# Patient Record
Sex: FEMALE | Race: WHITE | URBAN - METROPOLITAN AREA
[De-identification: names, ages, dates, MRNs, and addresses within clinical notes are randomized per-mention and may not be internally consistent; named-entity substitution may affect disease eponyms.]

---

## 2020-01-29 ENCOUNTER — OFFICE VISIT - RIVER FALLS (OUTPATIENT)
Dept: FAMILY MEDICINE | Facility: CLINIC | Age: 60
End: 2020-01-29

## 2020-01-29 LAB
ALBUMIN UR-MCNC: NORMAL G/DL
APPEARANCE UR: NORMAL
BILIRUB UR QL STRIP: NORMAL
COLOR UR AUTO: YELLOW
GLUCOSE UR STRIP-MCNC: NORMAL MG/DL
HGB UR QL STRIP: NORMAL
KETONES UR STRIP-MCNC: NORMAL MG/DL
LEUKOCYTE ESTERASE UR QL STRIP: NORMAL
NITRATE UR QL: NEGATIVE
PH UR STRIP: 5 [PH]
SP GR UR STRIP: 1.02
UROBILINOGEN UR STRIP-MCNC: NORMAL MG/DL

## 2020-01-29 ASSESSMENT — MIFFLIN-ST. JEOR: SCORE: 1434.76

## 2020-02-04 ENCOUNTER — OFFICE VISIT - RIVER FALLS (OUTPATIENT)
Dept: FAMILY MEDICINE | Facility: CLINIC | Age: 60
End: 2020-02-04

## 2020-02-04 ASSESSMENT — MIFFLIN-ST. JEOR: SCORE: 1445.64

## 2020-02-10 ENCOUNTER — OFFICE VISIT - RIVER FALLS (OUTPATIENT)
Dept: FAMILY MEDICINE | Facility: CLINIC | Age: 60
End: 2020-02-10

## 2020-02-10 ASSESSMENT — MIFFLIN-ST. JEOR: SCORE: 1466.51

## 2020-02-17 ENCOUNTER — OFFICE VISIT - RIVER FALLS (OUTPATIENT)
Dept: FAMILY MEDICINE | Facility: CLINIC | Age: 60
End: 2020-02-17

## 2020-02-17 ASSESSMENT — MIFFLIN-ST. JEOR: SCORE: 1473.76

## 2020-02-24 ENCOUNTER — OFFICE VISIT - RIVER FALLS (OUTPATIENT)
Dept: FAMILY MEDICINE | Facility: CLINIC | Age: 60
End: 2020-02-24

## 2020-02-24 ASSESSMENT — MIFFLIN-ST. JEOR: SCORE: 1471.95

## 2020-03-03 ENCOUNTER — OFFICE VISIT - RIVER FALLS (OUTPATIENT)
Dept: FAMILY MEDICINE | Facility: CLINIC | Age: 60
End: 2020-03-03

## 2020-03-03 ASSESSMENT — MIFFLIN-ST. JEOR: SCORE: 1468.32

## 2020-03-09 ENCOUNTER — OFFICE VISIT - RIVER FALLS (OUTPATIENT)
Dept: FAMILY MEDICINE | Facility: CLINIC | Age: 60
End: 2020-03-09

## 2020-03-09 ASSESSMENT — MIFFLIN-ST. JEOR: SCORE: 1468.32

## 2020-03-16 ENCOUNTER — OFFICE VISIT - RIVER FALLS (OUTPATIENT)
Dept: FAMILY MEDICINE | Facility: CLINIC | Age: 60
End: 2020-03-16

## 2020-12-14 ENCOUNTER — OFFICE VISIT - RIVER FALLS (OUTPATIENT)
Dept: FAMILY MEDICINE | Facility: CLINIC | Age: 60
End: 2020-12-14

## 2020-12-14 ASSESSMENT — MIFFLIN-ST. JEOR: SCORE: 1496.44

## 2022-02-12 VITALS
OXYGEN SATURATION: 97 % | SYSTOLIC BLOOD PRESSURE: 140 MMHG | BODY MASS INDEX: 37.7 KG/M2 | HEART RATE: 83 BPM | WEIGHT: 212.8 LBS | DIASTOLIC BLOOD PRESSURE: 68 MMHG | TEMPERATURE: 96.4 F | HEIGHT: 63 IN

## 2022-02-12 VITALS
HEIGHT: 63 IN | BODY MASS INDEX: 36.82 KG/M2 | DIASTOLIC BLOOD PRESSURE: 72 MMHG | BODY MASS INDEX: 36.54 KG/M2 | TEMPERATURE: 96.6 F | DIASTOLIC BLOOD PRESSURE: 58 MMHG | WEIGHT: 201.6 LBS | SYSTOLIC BLOOD PRESSURE: 120 MMHG | OXYGEN SATURATION: 98 % | HEART RATE: 95 BPM | HEIGHT: 63 IN | DIASTOLIC BLOOD PRESSURE: 70 MMHG | OXYGEN SATURATION: 98 % | SYSTOLIC BLOOD PRESSURE: 102 MMHG | BODY MASS INDEX: 35.72 KG/M2 | HEIGHT: 63 IN | WEIGHT: 207.8 LBS | HEART RATE: 106 BPM | WEIGHT: 206.2 LBS | WEIGHT: 199.2 LBS | TEMPERATURE: 97.8 F | OXYGEN SATURATION: 99 % | HEART RATE: 112 BPM | SYSTOLIC BLOOD PRESSURE: 110 MMHG | HEART RATE: 99 BPM | HEIGHT: 63 IN | DIASTOLIC BLOOD PRESSURE: 60 MMHG | BODY MASS INDEX: 35.3 KG/M2 | SYSTOLIC BLOOD PRESSURE: 120 MMHG | OXYGEN SATURATION: 99 %

## 2022-02-12 VITALS
BODY MASS INDEX: 36.61 KG/M2 | SYSTOLIC BLOOD PRESSURE: 124 MMHG | HEART RATE: 101 BPM | BODY MASS INDEX: 36.75 KG/M2 | DIASTOLIC BLOOD PRESSURE: 84 MMHG | OXYGEN SATURATION: 98 % | HEART RATE: 100 BPM | HEIGHT: 63 IN | HEART RATE: 108 BPM | TEMPERATURE: 98.2 F | BODY MASS INDEX: 36.61 KG/M2 | DIASTOLIC BLOOD PRESSURE: 80 MMHG | OXYGEN SATURATION: 98 % | WEIGHT: 206.6 LBS | HEIGHT: 63 IN | OXYGEN SATURATION: 98 % | SYSTOLIC BLOOD PRESSURE: 142 MMHG | HEIGHT: 63 IN | SYSTOLIC BLOOD PRESSURE: 126 MMHG | WEIGHT: 206.6 LBS | WEIGHT: 207.4 LBS | DIASTOLIC BLOOD PRESSURE: 80 MMHG
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
## 2022-02-16 NOTE — TELEPHONE ENCOUNTER
---------------------  From: Jazmyne Ram MA (eRx Pool ([id number] - [place]))   To: Amauri Marvin PA-C;     Sent: 7/6/2020 9:27:39 AM CDT  Subject: FW: Medication Management   Due Date/Time: 7/7/2020 9:24:00 AM CDT     PCP:   [facility]    Medication:   Eliquis  Last Filled:  2/24/20    Quantity:  60  Refills:  4    Date of last office visit and reason:   3/9/20  Date of last labs pertaining to condition:  n/a    Note:  please advise     Return to Clinic order placed?  yes     Resource:   _  Phone:   _      ------------------------------------------  From: [facility]  To: Amauri Marvin PA-C  Sent: July 6, 2020 9:24:28 AM CDT  Subject: Medication Management  Due: June 24, 2020 10:20:04 PM CDT     ** On Hold Pending Signature **     Drug: apixaban (Eliquis 5 mg oral tablet), 1 tab(s) Oral bid,x30 day(s)  Quantity: 60 tab(s)  Days Supply: 0  Refills: 3  Substitutions Allowed  Notes from Pharmacy:     Dispensed Drug: apixaban (Eliquis 5 mg oral tablet), TAKE ONE TABLET BY MOUTH TWICE DAILY  Quantity: 60 tab(s)  Days Supply: 30  Refills: 4  Substitutions Allowed  Notes from Pharmacy: This prescription was filled on 7/6/2020. Any refills authorized will be placed on file.  ---------------------------------------------------------------  From: Amauri Marvin PA-C   To: [facility] [employer] [facility]    Sent: 7/6/2020 9:28:21 AM CDT  Subject: FW: Medication Management     ** Submitted: **  Complete:apixaban (Eliquis 5 mg oral tablet)   Signed by Amauri Marvin PA-C  7/6/2020 2:28:00 PM [facility]    ** Approved with modifications: **  apixaban (Eliquis 5 mg tablet)  TAKE ONE TABLET BY MOUTH TWICE DAILY  Qty:  60 tab(s)        Days Supply:  30        Refills:  4          Substitutions Allowed     Route To Pharmacy - [facility]   Note from Pharmacy:  This prescription was filled on 7/6/2020. Any refills authorized will be placed on file.
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
## 2022-02-16 NOTE — TELEPHONE ENCOUNTER
Entered by Cherri Rouse CMA on January 26, 2021 10:34:21 AM CST  ---------------------  From: Cherri Rouse CMA   To: [facility] Dom Dietrich    Sent: 1/26/2021 10:34:21 AM CST  Subject: Medication Management     ** Submitted: **  Order:apixaban (Eliquis 5 mg oral tablet)  1 tab(s)  Oral  bid  Qty:  60 tab(s)        Refills:  0          Substitutions Allowed     Route To Pharmacy - [facility] [place] Dom Dietrich    Signed by Cherri Rouse CMA  1/26/2021 4:33:00 PM [place]    ** Submitted: **  Complete:apixaban (Eliquis 5 mg oral tablet)   Signed by Cherri Rouse CMA  1/26/2021 4:34:00 PM [place]    ** Not Approved:  **  apixaban (Eliquis 5 mg tablet)  TAKE ONE TABLET BY MOUTH TWICE DAILY  Qty:  60 tab(s)        Days Supply:  30        Refills:  4          Substitutions Allowed     Route To Pharmacy - [facility] Dom  Karlo   Signed by Cherri Rouse CMA            ------------------------------------------  From: [facility]  To: Amauri Marvin PA-C  Sent: January 25, 2021 8:53:31 AM CST  Subject: Medication Management  Due: January 22, 2021 9:58:09 AM CST     ** On Hold Pending Signature **     Dispensed Drug: apixaban (Eliquis 5 mg oral tablet), TAKE ONE TABLET BY MOUTH TWICE DAILY  Quantity: 60 tab(s)  Days Supply: 30  Refills: 4  Substitutions Allowed  Notes from Pharmacy:  ------------------------------------------12/14/20 preop  1/29/20 px  rtc due this month
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
## 2022-02-16 NOTE — TELEPHONE ENCOUNTER
Call to patient. She is improved. No pain, minimal swelling. Off work this week, then go back next week with no OT. FU in one month and PRN.

## 2022-02-16 NOTE — NURSING NOTE
CAGE Assessment Entered On:  1/29/2020 10:56 AM CST    Performed On:  1/29/2020 10:56 AM CST by Valeria Garcia CMA               Assessment   Have you ever felt you should cut down on your drinking :   No   Have people annoyed you by criticizing your drinking :   No   Have you ever felt bad or guilty about your drinking :   No   Have you ever taken a drink first thing in the morning to steady your nerves or get rid of a hangover (Eye-opener) :   No   CAGE Score :   0    Valeria Garcia CMA - 1/29/2020 10:56 AM CST

## 2022-02-16 NOTE — NURSING NOTE
Comprehensive Intake Entered On:  2/4/2020 9:33 AM CST    Performed On:  2/4/2020 9:27 AM CST by Lizz Chacon CMA               Summary   Chief Complaint :   Right leg pain; leg is swollen; warm to the touch; started yesterday; keeping elevated, not helping; unsure if it is an allergic reaction to the keflex   Menstrual Status :   Postmenopausal   Weight Measured :   201.6 lb(Converted to: 201 lb 10 oz, 91.44 kg)    Height Measured :   62.5 in(Converted to: 5 ft 2 in, 158.75 cm)    Body Mass Index :   36.28 kg/m2 (HI)    Body Surface Area :   2.01 m2   Systolic Blood Pressure :   102 mmHg   Diastolic Blood Pressure :   60 mmHg   Mean Arterial Pressure :   74 mmHg   Peripheral Pulse Rate :   95 bpm   BP Method :   Manual   HR Method :   Electronic   Oxygen Saturation :   98 %   Lizz Chacon CMA - 2/4/2020 9:27 AM CST   Health Status   Allergies Verified? :   Yes   Medication History Verified? :   Yes   Medical History Verified? :   Yes   Pre-Visit Planning Status :   Completed   Tobacco Use? :   Never smoker   Lizz Chacon CMA - 2/4/2020 9:27 AM CST   Consents   Consent for Immunization Exchange :   Consent Granted   Consent for Immunizations to Providers :   Consent Granted   Lizz Chacon CMA - 2/4/2020 9:27 AM CST   Meds / Allergies   (As Of: 2/4/2020 9:33:00 AM CST)   Allergies (Active)   sulfa drug  Estimated Onset Date:   Unspecified ; Reactions:   Rash ; Created By:   Valeria Garcia CMA; Reaction Status:   Active ; Category:   Drug ; Substance:   sulfa drug ; Type:   Allergy ; Updated By:   Valeria Garcia CMA; Reviewed Date:   2/4/2020 9:27 AM CST        Medication List   (As Of: 2/4/2020 9:33:00 AM [facility])   Prescription/Discharge Order    cephalexin  :   cephalexin ; Status:   Prescribed ; Ordered As Mnemonic:   Keflex 500 mg oral capsule ; Simple Display Line:   500 mg, 1 cap(s), PO, TID, for 10 day(s), 30 cap(s), 0 Refill(s) ; Ordering Provider:   Amauri Marvin PA-C; Catalog Code:   cephalexin ; Order  Dt/Tm:   1/29/2020 9:38:03 AM CST

## 2022-02-16 NOTE — NURSING NOTE
Comprehensive Intake Entered On:  3/3/2020 9:58 AM CST    Performed On:  3/3/2020 9:54 AM CST by Lizz Chacon CMA               Summary   Chief Complaint :   Follow-up Right DVT; swelling has gone down some; pain still in groin area; Resent ultrasound   Menstrual Status :   Postmenopausal   Weight Measured :   206.6 lb(Converted to: 206 lb 10 oz, 93.71 kg)    Height Measured :   62.5 in(Converted to: 5 ft 2 in, 158.75 cm)    Body Mass Index :   37.18 kg/m2 (HI)    Body Surface Area :   2.03 m2   Systolic Blood Pressure :   124 mmHg   Diastolic Blood Pressure :   80 mmHg   Mean Arterial Pressure :   95 mmHg   Peripheral Pulse Rate :   108 bpm (HI)    BP Method :   Manual   HR Method :   Electronic   Oxygen Saturation :   98 %   Lizz Chacon CMA - 3/3/2020 9:54 AM CST   Health Status   Allergies Verified? :   Yes   Medication History Verified? :   Yes   Medical History Verified? :   Yes   Pre-Visit Planning Status :   Completed   Tobacco Use? :   Never smoker   Lizz Chacon CMA - 3/3/2020 9:54 AM CST   Consents   Consent for Immunization Exchange :   Consent Granted   Consent for Immunizations to Providers :   Consent Granted   Lizz Chacon CMA - 3/3/2020 9:54 AM CST   Meds / Allergies   (As Of: 3/3/2020 9:58:25 AM CST)   Allergies (Active)   sulfa drug  Estimated Onset Date:   Unspecified ; Reactions:   Rash ; Created By:   Valeria Garcia CMA; Reaction Status:   Active ; Category:   Drug ; Substance:   sulfa drug ; Type:   Allergy ; Updated By:   Valeria Garcia CMA; Reviewed Date:   3/3/2020 9:57 AM CST        Medication List   (As Of: 3/3/2020 9:58:25 AM CST)   Prescription/Discharge Order    apixaban  :   apixaban ; Status:   Prescribed ; Ordered As Mnemonic:   Eliquis 5 mg oral tablet ; Simple Display Line:   5 mg, 1 tab(s), Oral, bid, for 30 day(s), 60 tab(s), 4 Refill(s) ; Ordering Provider:   Amauri Marvin PA-C; Catalog Code:   apixaban ; Order Dt/Tm:   2/24/2020 11:20:19 AM CST            Home Meds     acetaminophen  :   acetaminophen ; Status:   Documented ; Ordered As Mnemonic:   Tylenol ; Simple Display Line:   Oral, 0 Refill(s) ; Catalog Code:   acetaminophen ; Order Dt/Tm:   2/17/2020 10:09:42 AM CST

## 2022-02-16 NOTE — NURSING NOTE
Comprehensive Intake Entered On:  3/9/2020 10:29 AM CDT    Performed On:  3/9/2020 10:26 AM CDT by Lizz Chacon CMA               Summary   Chief Complaint :   follow-up right DVT; starting to walk and drive more; pain still in the groin area   Menstrual Status :   Postmenopausal   Weight Measured :   206.6 lb(Converted to: 206 lb 10 oz, 93.71 kg)    Height Measured :   62.5 in(Converted to: 5 ft 2 in, 158.75 cm)    Body Mass Index :   37.18 kg/m2 (HI)    Body Surface Area :   2.03 m2   Systolic Blood Pressure :   142 mmHg (HI)    Diastolic Blood Pressure :   80 mmHg   Mean Arterial Pressure :   101 mmHg   Peripheral Pulse Rate :   101 bpm (HI)    BP Method :   Manual   HR Method :   Electronic   Oxygen Saturation :   98 %   Lizz Chacon CMA - 3/9/2020 10:26 AM CDT   Health Status   Allergies Verified? :   Yes   Medication History Verified? :   Yes   Medical History Verified? :   Yes   Pre-Visit Planning Status :   Completed   Tobacco Use? :   Never smoker   Lizz Chacon CMA - 3/9/2020 10:26 AM CDT   Consents   Consent for Immunization Exchange :   Consent Granted   Consent for Immunizations to Providers :   Consent Granted   Lizz Chacon CMA - 3/9/2020 10:26 AM CDT   Meds / Allergies   (As Of: 3/9/2020 10:29:19 AM CDT)   Allergies (Active)   sulfa drug  Estimated Onset Date:   Unspecified ; Reactions:   Rash ; Created By:   Valeria Garcia CMA; Reaction Status:   Active ; Category:   Drug ; Substance:   sulfa drug ; Type:   Allergy ; Updated By:   Valeria Garcia CMA; Reviewed Date:   3/9/2020 10:27 AM CDT        Medication List   (As Of: 3/9/2020 10:29:19 AM CDT)   Prescription/Discharge Order    apixaban  :   apixaban ; Status:   Prescribed ; Ordered As Mnemonic:   Eliquis 5 mg oral tablet ; Simple Display Line:   5 mg, 1 tab(s), Oral, bid, for 30 day(s), 60 tab(s), 4 Refill(s) ; Ordering Provider:   Amauri Marvin PA-C; Catalog Code:   apixaban ; Order Dt/Tm:   2/24/2020 11:20:19 AM [facility]            Home Meds     acetaminophen  :   acetaminophen ; Status:   Documented ; Ordered As Mnemonic:   Tylenol ; Simple Display Line:   Oral, 0 Refill(s) ; Catalog Code:   acetaminophen ; Order Dt/Tm:   2/17/2020 10:09:42 AM CST
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
## 2022-02-16 NOTE — NURSING NOTE
Comprehensive Intake Entered On:  2/10/2020 10:17 AM CST    Performed On:  2/10/2020 10:13 AM CST by Lizz Chacon CMA               Summary   Chief Complaint :   Follow-up right leg pain; still has grion pain; leg is still swollen but not as hard as it was last week. Increase in fatigue; taking tylenol for pain;    Menstrual Status :   Postmenopausal   Weight Measured :   206.2 lb(Converted to: 206 lb 3 oz, 93.53 kg)    Height Measured :   62.5 in(Converted to: 5 ft 2 in, 158.75 cm)    Body Mass Index :   37.11 kg/m2 (HI)    Body Surface Area :   2.03 m2   Systolic Blood Pressure :   120 mmHg   Diastolic Blood Pressure :   72 mmHg   Mean Arterial Pressure :   88 mmHg   Peripheral Pulse Rate :   99 bpm   BP Method :   Manual   HR Method :   Electronic   Oxygen Saturation :   99 %   Lizz Chacon CMA - 2/10/2020 10:13 AM CST   Health Status   Allergies Verified? :   Yes   Medication History Verified? :   Yes   Medical History Verified? :   Yes   Pre-Visit Planning Status :   Completed   Tobacco Use? :   Never smoker   Lizz Chacon CMA - 2/10/2020 10:13 AM CST   Consents   Consent for Immunization Exchange :   Consent Granted   Consent for Immunizations to Providers :   Consent Granted   Lizz Chacon CMA - 2/10/2020 10:13 AM CST   Meds / Allergies   (As Of: 2/10/2020 10:17:41 AM CST)   Allergies (Active)   sulfa drug  Estimated Onset Date:   Unspecified ; Reactions:   Rash ; Created By:   Valeria Garcia CMA; Reaction Status:   Active ; Category:   Drug ; Substance:   sulfa drug ; Type:   Allergy ; Updated By:   Valeria Garcia CMA; Reviewed Date:   2/10/2020 10:16 AM CST        Medication List   (As Of: 2/10/2020 10:17:41 AM [facility])   Prescription/Discharge Order    apixaban  :   apixaban ; Status:   Prescribed ; Ordered As Mnemonic:   Eliquis 5 mg oral tablet ; Simple Display Line:   See Instructions, 10 mg Oral bid x one week, then 5 mg po BID, 58 tab(s), 0 Refill(s) ; Ordering Provider:   Amauri Marvin PA-C; Catalog  Code:   apixaban ; Order Dt/Tm:   2/4/2020 1:42:24 PM CST
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
## 2022-02-16 NOTE — NURSING NOTE
Urine Dipstick POC Entered On:  1/29/2020 9:30 AM CST    Performed On:  1/29/2020 9:29 AM CST by Schoenike , Andrea               Urine Dipstick POC   Urine Color Urine Dipstick :   Yellow   Urine Appearance Urine Dipstick :   Slightly cloudy   Glucose Urine Dipstick :   250 mg/dl   Bilirubin Urine Dipstick :   1+ Small   Ketones Urine Dipstick :   2+   Specific Gravity Urine Dipstick :   1.025   Blood Urine Dipstick :   3+ Large   pH Urine Dipstick :   5   Protein Urine Dipstick :   2+ (100 mg/dl)   Urobilinogen Urine Dipstick :   0.2 mg/dl   Nitrite Urine Dipstick :   Negative   Leukocytes Urine Dipstick :   1+ Small   Schoenike , Andrea - 1/29/2020 9:29 AM CST   Details   Collection Date :   1/29/2020 9:25 AM CST   Handling Specimen POC :   Midstream   POC Test Comments :   Lab Test Preformed by:   [facility] Office  [address] [place] [address]  Phone: [phone]  Fax: [phone]     Schoenike , Andrea - 1/29/2020 9:29 AM CST

## 2022-02-16 NOTE — LETTER
(Inserted Image. Unable to display)   January 29, 2021      FERDINAND LEOS  [address] [place], [place] [id number]        Dear FERDINAND,      Thank you for selecting [facility] (previously [facility]) for your healthcare needs.     Our records indicate you are due for the following services:     Annual Physical    (FYI   Regarding office visits: In some instances, a video visit or telephone visit may be offered as an option.)      To schedule an appointment or if you have further questions, please contact your clinic at [phone].      Powered by [employer]    Sincerely,    Amauri Marvin PA-C

## 2022-02-16 NOTE — TELEPHONE ENCOUNTER
I called patient to check her progress. She states is doing well. Pain and swelling are improved. No SOB or chest pain. Will see me on Monday and prior PRN.    KAH

## 2022-02-16 NOTE — PROGRESS NOTES
Patient:   FERDINAND LEOS            MRN: [id number]            FIN: [id number]               Age:   59 years     Sex:  Female     :  1960   Associated Diagnoses:   Upper respiratory infection; Acute cystitis with hematuria; Glycosuria   Author:   Amauri Marvin PA-C      Report Summary   Diagnosis  Upper respiratory infection (TOL82-SK J06.9).  Patient InstructionsOrders   Visit Information      Date of Service: 2020 08:52 am  Performing Location: [facility]  Encounter#: [id number]      Primary Care Provider (PCP):  NONE ,       Referring Provider:  Amauri Marvin PA-C    NPI# [id number]   Visit type:  General concerns, New symptom.    Accompanied by:  No one.    Source of history:  Self, Medical record.    History limitation:  None.       Chief Complaint   2020 9:06 AM CST    New Patient: c/o bubbles in urine, sore throat, cough, runny nose, ears feel funny x 3 days      History of Present Illness             The patient presents with cough.  The cough is described as hacking.  The severity of the cough is moderate.  The cough is episodic, fluctuates in intensity and is worsening.  The cough has lasted for 3 day(s).  The context of the cough: occurred in association with illness.  Associated symptoms consist of nasal congestion, rhinorrhea, sore throat and denies shortness of breath.  Complaint: Noticed bubbles in urine past few days CC above noted and confirmed with the patient..  Feels some better today.  History of DM2. Not currently on any medications. Purposeful weight loss of over 60 pounds.        Review of Systems   Constitutional:  Fatigue.    Eye:  Negative.    Ear/Nose/Mouth/Throat:  Nasal congestion.    Respiratory:  Cough.    Cardiovascular:  Negative.    Gastrointestinal:  Negative.    Genitourinary:  Negative except as documented in history of present illness.    Hematology/Lymphatics:  Negative.    Endocrine:  Negative.    Immunologic:  Negative.    Musculoskeletal:   Joint pain.    Integumentary:  Negative.    Neurologic:  Negative.    Psychiatric:  Negative.       Health Status   Allergies:    Allergic Reactions (All)  Severity Not Documented  Sulfa drug (Rash)   Medications:  (Selected)   ,    Medications          No Known Home Medications     Problem list:    All Problems  Obesity / SNOMED CT 6686359482 / Probable      Histories   Past Medical History:    No active or resolved past medical history items have been selected or recorded.   Family History:    No family history items have been selected or recorded.   Procedure history:    No active procedure history items have been selected or recorded.   Social History:             No active social history items have been recorded.      Physical Examination   Vital Signs   1/29/2020 9:06 AM CST Temperature Tympanic 97.8 DegF  LOW    Peripheral Pulse Rate 112 bpm  HI    HR Method Electronic    Systolic Blood Pressure 110 mmHg    Diastolic Blood Pressure 58 mmHg  LOW    Mean Arterial Pressure 75 mmHg    BP Site Left arm    BP Method Manual    Oxygen Saturation 98 %      Measurements from flowsheet : Measurements   1/29/2020 9:06 AM CST Height Measured - Standard 62.5 in    Weight Measured - Standard 199.2 lb    BSA 1.99 m2    Body Mass Index 35.85 kg/m2  HI      General:  Alert and oriented, No acute distress.    Eye:  Pupils are equal, round and reactive to light, Extraocular movements are intact, Normal conjunctiva.    HENT:  Normocephalic, Oral mucosa is moist, No pharyngeal erythema.    Neck:  Supple, Non-tender, No lymphadenopathy.    Respiratory:  Lungs are clear to auscultation, Respirations are non-labored, Breath sounds are equal.    Cardiovascular:  Normal rate, Regular rhythm, No murmur.    Gastrointestinal:  Soft, Non-tender, Non-distended, No organomegaly.    Genitourinary:  No costovertebral angle tenderness.    Psychiatric:  Cooperative, Appropriate mood & affect.       Review / Management   Results review:  Lab  results   1/29/2020 9:29 AM CST Urine Color Dipstick Yellow    Urine Appearance Slightly cloudy    Urine pH Dipstick 5    Urine Specific Gravity Dipstick 1.025    Urine Glucose Dipstick 250 mg/dl    Urine Bilirubin Dipstick Small    Urine Ketones Dipstick 2+    Urine Blood Dipstick Large    Urine Protein Dipstick 100 mg/dl    Urine Nitrite Dipstick Negative    Urine Leukocyte Esterase Dipstick Small    Urine Urobilinogen Dipstick 0.2 mg/dl   .       Impression and Plan   Diagnosis     Upper respiratory infection (BAG46-KM J06.9).     Acute cystitis with hematuria (YNS09-CL N30.01).     Glycosuria (OKW42-FN R81).     Patient Instructions:       Counseled: Patient, Regarding diagnosis, Regarding treatment, Regarding medications, Regarding diet, Regarding activity, Verbalized understanding.    Orders     Orders (Selected)   Prescriptions  Prescribed  Keflex 500 mg oral capsule: = 1 cap(s) ( 500 mg ), PO, TID, x 10 day(s), # 30 cap(s), 0 Refill(s), Type: Acute, Pharmacy: [facility] , 1 cap(s) Oral tid,x10 day(s).     Take medicine as prescribed, side effects discussed.  Tylenol/ibuprofen for fever and discomfort.  Push fluids.  RTC if not improving in 36-48 hours, prior if concerns as we have discussed.  Follow up with her primary. Will need blood work to see if her DM needs treatment.

## 2022-02-16 NOTE — TELEPHONE ENCOUNTER
---------------------  From: Valeria Garcia CMA (Phone [employer] ([id number]_[facility]))   To: Roberta Zaidi MD;     Sent: 4/27/2020 2:31:27 PM CDT  Subject: phone note- work note     Phone Message    PCP:    KAH      Time of Call:  12:37 pm       Person Calling:   Rina  Phone number:  [phone]    Returned call at: 2:26 pm    Note:  Patient called stating her work is requiring that the note TIMOTHY wrote for patient to be typed on clinic letter headed. Then [employer] would like it faxed to them. Patient read copy to me: 3/16/2020: Rina Wright,   No work for the week due to illness, may return 3-, no OT, scheduled shifts only, no mandatory overtime or covering other shifts. Please fax to Fax # [phone]   Attn: Cherri Mario.    Transferred to: [facility]---------------------  From: Roberta Zaidi MD   To: Phone [employer] ([id number]_[facility]);     Sent: 4/27/2020 2:33:29 PM CDT  Subject: RE: phone note- work note     I can take care of this tomorrow in [place].notedprinted - please fax and then scan to chart---------------------  From: Roberta Zaidi MD   To: Phone [employer] ([id number]_[facility]);     Sent: 4/28/2020 8:28:48 AM CDT  Subject: RE: phone note- work noteNote faxed @ 9:00amnote sent up front to be scanned into EMR.Time: 11:25 am  Note: SAMUEL on patients voicemail, notifying her that this has been taking care of, faxed to work this Morning.

## 2022-02-16 NOTE — PROGRESS NOTES
Patient:   FERDINAND LEOS            MRN: [id number]            FIN: [id number]               Age:   59 years     Sex:  Female     :  1960   Associated Diagnoses:   Pain and swelling of right lower leg; Right leg DVT   Author:   Amauri Marvin PA-C      Report Summary   Diagnosis  Pain and swelling of right lower leg (BHD76-KT M79.661).  Patient InstructionsOrders   Visit Information      Date of Service: 2020 09:20 am  Performing Location: [facility]  Encounter#: [id number]      Primary Care Provider (PCP):  NONE ,       Referring Provider:  Amauri Marvin PA-C    NPI# [id number]   Visit type:  General concerns.    Accompanied by:  No one.    Source of history:  Self, Medical record.    Referral source:  Self.    History limitation:  None.       Chief Complaint   2020 9:27 AM CST     Right leg pain; leg is swollen; warm to the touch; started yesterday; keeping elevated, not helping; unsure if it is an allergic reaction to the keflex      History of Present Illness             The patient presents with lower extremity pain.  The location of the lower extremity pain is the right.  The lower extremity pain is described as aching.  The severity of the lower extremity pain is moderate.  The lower extremity pain is constant.  The lower extremity pain has lasted for 1 day(s).  Radiation of pain: none.  Right leg swollen and painful. Cough and urinary symptoms have cleared. Has had 5 days of Keflex. CC above noted and confirmed with the patient..  No SOB, no chest pain..        Review of Systems   Constitutional:  Negative.    Cardiovascular:  Negative except as documented in history of present illness.    Musculoskeletal:  Negative except as documented in history of present illness.    Integumentary:  Negative except as documented in history of present illness.    Neurologic:  Negative except as documented in history of present illness.       Health Status   Allergies:    Allergic Reactions  (Selected)  Severity Not Documented  Sulfa drug (Rash)   Medications:  (Selected)   Prescriptions  Prescribed  Keflex 500 mg oral capsule: = 1 cap(s) ( 500 mg ), PO, TID, x 10 day(s), # 30 cap(s), 0 Refill(s), Type: Acute, Pharmacy: [facility] , 1 cap(s) Oral tid,x10 day(s),    Medications          *denotes recorded medication          Keflex 500 mg oral capsule: 500 mg, 1 cap(s), PO, TID, for 10 day(s), 30 cap(s), 0 Refill(s).       Problem list:    All Problems  Obesity / SNOMED CT 0450246705 / Probable      Histories   Past Medical History:    No active or resolved past medical history items have been selected or recorded.   Family History:    No family history items have been selected or recorded.   Procedure history:    No active procedure history items have been selected or recorded.   Social History:        Alcohol Assessment            Current, 1-2 times per month, 2 drinks/episode average.      Tobacco Assessment: Denies Tobacco Use            Never (less than 100 in lifetime)      Substance Abuse Assessment: Denies Substance Abuse            Never      Employment and Education Assessment            Employed, Work/School description: .      Home and Environment Assessment            Marital status: .      Sexual Assessment            Identifies as female, Sexual orientation: Straight or heterosexual.        Physical Examination   Vital Signs   2/4/2020 9:27 AM CST Peripheral Pulse Rate 95 bpm    HR Method Electronic    Systolic Blood Pressure 102 mmHg    Diastolic Blood Pressure 60 mmHg    Mean Arterial Pressure 74 mmHg    BP Method Manual    Oxygen Saturation 98 %      Measurements from flowsheet : Measurements   2/4/2020 9:27 AM CST Height Measured - Standard 62.5 in    Weight Measured - Standard 201.6 lb    BSA 2.01 m2    Body Mass Index 36.28 kg/m2  HI      Respiratory:  Respirations are non-labored.    Cardiovascular:  Normal rate.         Arterial  pulses: Right, Posterior tibial, Dorsalis pedis, 2+.         Capillary refill: Right, Lower extremity, Within normal limits.    Musculoskeletal:  Right leg is swollen. Tender in anterior medial thigh..       Review / Management   Radiology results   Ultrasound, Large RLE DVT to mid calf      Impression and Plan   Diagnosis     Pain and swelling of right lower leg (DFX55-TW M79.661).     Right leg DVT (SZM14-IW I82.401).     Patient Instructions:       Counseled: Patient, Activity, Verbalized understanding.    Orders     Orders (Selected)   Prescriptions  Prescribed  Eliquis 5 mg oral tablet: See Instructions, Instructions: 10 mg Oral bid x one week, then 5 mg po BID, # 58 tab(s), 0 Refill(s), Type: Maintenance, Pharmacy: [facility] , 10 mg Oral bid x one week, then 5 mg po BID.     Discussed treatment options. Settled on Eliquis. Dosing and side effects discussed. To ED if any chest pain or SOB. RTC on Monday. Anticipate treating for four to six months for first DVT.

## 2022-02-16 NOTE — PROGRESS NOTES
Patient:   FERDINAND LEOS            MRN: [id number]            FIN: [id number]               Age:   59 years     Sex:  Female     :  1960   Associated Diagnoses:   Right leg DVT   Author:   Amauri Marvin PA-C      Report Summary   Diagnosis  Right leg DVT (IIC33-SQ I82.401).  Patient Instructions   Visit Information      Date of Service: 02/10/2020 10:03 am  Performing Location: [facility]  Encounter#: [id number]      Primary Care Provider (PCP):  NONE ,       Referring Provider:  Amauri Marvin PA-C    NPI# [id number]   Visit type:  General concerns.    Accompanied by:  No one.    Source of history:  Self, Medical record.    Referral source:  Self.    History limitation:  None.       Chief Complaint   2/10/2020 10:13 AM CST   Follow-up right leg pain; still has grion pain; leg is still swollen but not as hard as it was last week. Increase in fatigue; taking tylenol for pain;        History of Present Illness             The patient presents with lower extremity pain.  The location of the lower extremity pain is the right.  The lower extremity pain is described as aching.  The severity of the lower extremity pain is moderate.  The lower extremity pain is constant.  The lower extremity pain has lasted for 6 day(s).  Radiation of pain: none.  Right leg DVT. On Eliquis. No SOB or chest pain. Leg is less swollen and less tense. .  No SOB, no chest pain..        Review of Systems   Constitutional:  Negative.    Cardiovascular:  Negative except as documented in history of present illness.    Musculoskeletal:  Negative except as documented in history of present illness.    Integumentary:  Negative except as documented in history of present illness.    Neurologic:  Negative except as documented in history of present illness.       Health Status   Allergies:    Allergic Reactions (Selected)  Severity Not Documented  Sulfa drug (Rash)   Medications:  (Selected)   Prescriptions  Prescribed  Eliquis 5 mg  oral tablet: See Instructions, Instructions: 10 mg Oral bid x one week, then 5 mg po BID, # 58 tab(s), 0 Refill(s), Type: Maintenance, Pharmacy: [facility] , 10 mg Oral bid x one week, then 5 mg po BID,    Medications          *denotes recorded medication          Eliquis 5 mg oral tablet: See Instructions, 10 mg Oral bid x one week, then 5 mg po BID, 58 tab(s), 0 Refill(s).       Problem list:    All Problems  Obesity / SNOMED CT 3112746470 / Probable      Histories   Past Medical History:    No active or resolved past medical history items have been selected or recorded.   Family History:    No family history items have been selected or recorded.   Procedure history:    No active procedure history items have been selected or recorded.   Social History:        Alcohol Assessment            Current, 1-2 times per month, 2 drinks/episode average.      Tobacco Assessment: Denies Tobacco Use            Never (less than 100 in lifetime)      Substance Abuse Assessment: Denies Substance Abuse            Never      Employment and Education Assessment            Employed, Work/School description: .      Home and Environment Assessment            Marital status: .      Sexual Assessment            Identifies as female, Sexual orientation: Straight or heterosexual.        Physical Examination   Vital Signs   2/10/2020 10:13 AM CST Peripheral Pulse Rate 99 bpm    HR Method Electronic    Systolic Blood Pressure 120 mmHg    Diastolic Blood Pressure 72 mmHg    Mean Arterial Pressure 88 mmHg    BP Method Manual    Oxygen Saturation 99 %      Measurements from flowsheet : Measurements   2/10/2020 10:13 AM CST Height Measured - Standard 62.5 in    Weight Measured - Standard 206.2 lb    BSA 2.03 m2    Body Mass Index 37.11 kg/m2  HI      Respiratory:  Respirations are non-labored.    Cardiovascular:  Normal rate.         Arterial pulses: Right, Posterior tibial, Dorsalis pedis, 2+.          Capillary refill: Right, Lower extremity, Within normal limits.    Musculoskeletal:  Right leg is swollen. Tender in anterior medial thigh..       Impression and Plan   Diagnosis     Right leg DVT (JIR25-CF I82.401).     Patient Instructions:       Counseled: Patient, Regarding diagnosis, Regarding medications, Activity, Verbalized understanding.    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 RTC in one week and PRN. Emergent FU for SOB, chest pain, etc.

## 2022-02-16 NOTE — NURSING NOTE
Comprehensive Intake Entered On:  1/29/2020 9:11 AM CST    Performed On:  1/29/2020 9:06 AM CST by Valeria Garcia CMA               Summary   Chief Complaint :   New Patient: c/o bubbles in urine, sore throat, cough, runny nose, ears feel funny x 3 days   Weight Measured :   199.2 lb(Converted to: 199 lb 3 oz, 90.36 kg)    Height Measured :   62.5 in(Converted to: 5 ft 2 in, 158.75 cm)    Body Mass Index :   35.85 kg/m2 (HI)    Body Surface Area :   1.99 m2   Systolic Blood Pressure :   110 mmHg   Diastolic Blood Pressure :   58 mmHg (LOW)    Mean Arterial Pressure :   75 mmHg   Peripheral Pulse Rate :   112 bpm (HI)    BP Site :   Left arm   BP Method :   Manual   HR Method :   Electronic   Temperature Tympanic :   97.8 DegF(Converted to: 36.6 DegC)  (LOW)    Oxygen Saturation :   98 %   Valeria Garcia CMA - 1/29/2020 9:06 AM CST   Health Status   Allergies Verified? :   Yes   Medication History Verified? :   Yes   Medical History Verified? :   Yes   Valeria Garcia CMA - 1/29/2020 9:06 AM CST   Consents   Consent for Immunization Exchange :   Consent Granted   Consent for Immunizations to Providers :   Consent Granted   Valeria Garcia CMA - 1/29/2020 9:06 AM CST   Meds / Allergies   (As Of: 1/29/2020 9:11:51 AM CST)   Allergies (Active)   sulfa drug  Estimated Onset Date:   Unspecified ; Reactions:   Rash ; Created By:   Valeria Garcia CMA; Reaction Status:   Active ; Category:   Drug ; Substance:   sulfa drug ; Type:   Allergy ; Updated By:   Valeria Garcia CMA; Reviewed Date:   1/29/2020 9:10 AM CST        Medication List   (As Of: 1/29/2020 9:11:51 AM CST)   No Known Home Medications     Valeria Garcia CMA - 1/29/2020 9:10:28 AM
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
## 2023-03-13 ENCOUNTER — LAB REQUISITION (OUTPATIENT)
Dept: LAB | Facility: CLINIC | Age: 63
End: 2023-03-13
Payer: COMMERCIAL

## 2023-03-13 DIAGNOSIS — E11.622 TYPE 2 DIABETES MELLITUS WITH OTHER SKIN ULCER (CODE) (H): ICD-10-CM

## 2023-03-14 LAB
ANION GAP SERPL CALCULATED.3IONS-SCNC: 9 MMOL/L (ref 7–15)
BUN SERPL-MCNC: 27.2 MG/DL (ref 8–23)
CALCIUM SERPL-MCNC: 9.2 MG/DL (ref 8.8–10.2)
CHLORIDE SERPL-SCNC: 105 MMOL/L (ref 98–107)
CREAT SERPL-MCNC: 1.05 MG/DL (ref 0.51–0.95)
DEPRECATED HCO3 PLAS-SCNC: 23 MMOL/L (ref 22–29)
ERYTHROCYTE [DISTWIDTH] IN BLOOD BY AUTOMATED COUNT: 15.5 % (ref 10–15)
GFR SERPL CREATININE-BSD FRML MDRD: 60 ML/MIN/1.73M2
GLUCOSE SERPL-MCNC: 146 MG/DL (ref 70–99)
HCT VFR BLD AUTO: 36.5 % (ref 35–47)
HGB BLD-MCNC: 11.1 G/DL (ref 11.7–15.7)
MCH RBC QN AUTO: 27.6 PG (ref 26.5–33)
MCHC RBC AUTO-ENTMCNC: 30.4 G/DL (ref 31.5–36.5)
MCV RBC AUTO: 91 FL (ref 78–100)
PLATELET # BLD AUTO: 333 10E3/UL (ref 150–450)
POTASSIUM SERPL-SCNC: 4.9 MMOL/L (ref 3.4–5.3)
RBC # BLD AUTO: 4.02 10E6/UL (ref 3.8–5.2)
SODIUM SERPL-SCNC: 137 MMOL/L (ref 136–145)
WBC # BLD AUTO: 7.9 10E3/UL (ref 4–11)

## 2023-03-14 PROCEDURE — 80048 BASIC METABOLIC PNL TOTAL CA: CPT | Performed by: INTERNAL MEDICINE

## 2023-03-14 PROCEDURE — 36415 COLL VENOUS BLD VENIPUNCTURE: CPT | Mod: ORL | Performed by: INTERNAL MEDICINE

## 2023-03-14 PROCEDURE — P9603 ONE-WAY ALLOW PRORATED MILES: HCPCS | Mod: ORL | Performed by: INTERNAL MEDICINE

## 2023-03-14 PROCEDURE — 85027 COMPLETE CBC AUTOMATED: CPT | Performed by: INTERNAL MEDICINE

## 2023-03-19 ENCOUNTER — LAB REQUISITION (OUTPATIENT)
Dept: LAB | Facility: CLINIC | Age: 63
End: 2023-03-19
Payer: COMMERCIAL

## 2023-03-19 DIAGNOSIS — R11.0 NAUSEA: ICD-10-CM

## 2023-03-20 PROCEDURE — P9603 ONE-WAY ALLOW PRORATED MILES: HCPCS | Mod: ORL | Performed by: INTERNAL MEDICINE

## 2023-03-21 LAB
ANION GAP SERPL CALCULATED.3IONS-SCNC: 13 MMOL/L (ref 7–15)
BUN SERPL-MCNC: 28.9 MG/DL (ref 8–23)
CALCIUM SERPL-MCNC: 9.6 MG/DL (ref 8.8–10.2)
CHLORIDE SERPL-SCNC: 104 MMOL/L (ref 98–107)
CREAT SERPL-MCNC: 0.94 MG/DL (ref 0.51–0.95)
DEPRECATED HCO3 PLAS-SCNC: 21 MMOL/L (ref 22–29)
ERYTHROCYTE [DISTWIDTH] IN BLOOD BY AUTOMATED COUNT: 15.5 % (ref 10–15)
GFR SERPL CREATININE-BSD FRML MDRD: 69 ML/MIN/1.73M2
GLUCOSE SERPL-MCNC: 124 MG/DL (ref 70–99)
HCT VFR BLD AUTO: 38.3 % (ref 35–47)
HGB BLD-MCNC: 11.7 G/DL (ref 11.7–15.7)
MCH RBC QN AUTO: 27.5 PG (ref 26.5–33)
MCHC RBC AUTO-ENTMCNC: 30.5 G/DL (ref 31.5–36.5)
MCV RBC AUTO: 90 FL (ref 78–100)
PLATELET # BLD AUTO: 357 10E3/UL (ref 150–450)
POTASSIUM SERPL-SCNC: 4.5 MMOL/L (ref 3.4–5.3)
RBC # BLD AUTO: 4.25 10E6/UL (ref 3.8–5.2)
SODIUM SERPL-SCNC: 138 MMOL/L (ref 136–145)
WBC # BLD AUTO: 8 10E3/UL (ref 4–11)

## 2023-03-21 PROCEDURE — 36415 COLL VENOUS BLD VENIPUNCTURE: CPT | Mod: ORL | Performed by: INTERNAL MEDICINE

## 2023-03-21 PROCEDURE — 80048 BASIC METABOLIC PNL TOTAL CA: CPT | Performed by: INTERNAL MEDICINE

## 2023-03-21 PROCEDURE — 85027 COMPLETE CBC AUTOMATED: CPT | Performed by: INTERNAL MEDICINE

## 2023-04-04 NOTE — NURSING NOTE
Depression Screening Entered On:  1/29/2020 10:56 AM CST    Performed On:  1/29/2020 10:56 AM CST by Valeria Garcia CMA               Depression Screening   Little Interest - Pleasure in Activities :   Not at all   Feeling Down, Depressed, Hopeless :   Not at all   Initial Depression Screen Score :   0    Trouble Falling or Staying Asleep :   Not at all   Feeling Tired or Little Energy :   Not at all   Poor Appetite or Overeating :   Several days   Feeling Bad About Yourself :   Not at all   Trouble Concentrating :   Not at all   Moving or Speaking Slowly :   Not at all   Thoughts Better Off Dead or Hurting Self :   Not at all   Detailed Depression Screen Score :   1    Total Depression Screen Score :   1    ANDREW Difficulty with Work, Home, Others :   Not difficult at all   Valeria Garcia CMA - 1/29/2020 10:56 AM CST   None

## 2023-04-06 ENCOUNTER — LAB REQUISITION (OUTPATIENT)
Dept: LAB | Facility: CLINIC | Age: 63
End: 2023-04-06
Payer: COMMERCIAL

## 2023-04-06 DIAGNOSIS — R60.9 EDEMA, UNSPECIFIED: ICD-10-CM

## 2023-04-06 LAB
ANION GAP SERPL CALCULATED.3IONS-SCNC: 16 MMOL/L (ref 7–15)
BUN SERPL-MCNC: 24.5 MG/DL (ref 8–23)
CALCIUM SERPL-MCNC: 9.8 MG/DL (ref 8.8–10.2)
CHLORIDE SERPL-SCNC: 107 MMOL/L (ref 98–107)
CREAT SERPL-MCNC: 1.03 MG/DL (ref 0.51–0.95)
DEPRECATED HCO3 PLAS-SCNC: 21 MMOL/L (ref 22–29)
GFR SERPL CREATININE-BSD FRML MDRD: 62 ML/MIN/1.73M2
GLUCOSE SERPL-MCNC: 44 MG/DL (ref 70–99)
NT-PROBNP SERPL-MCNC: 348 PG/ML (ref 0–900)
POTASSIUM SERPL-SCNC: 4.9 MMOL/L (ref 3.4–5.3)
SODIUM SERPL-SCNC: 144 MMOL/L (ref 136–145)

## 2023-04-06 PROCEDURE — P9603 ONE-WAY ALLOW PRORATED MILES: HCPCS | Performed by: INTERNAL MEDICINE

## 2023-04-06 PROCEDURE — 36415 COLL VENOUS BLD VENIPUNCTURE: CPT | Performed by: INTERNAL MEDICINE

## 2023-04-06 PROCEDURE — 83880 ASSAY OF NATRIURETIC PEPTIDE: CPT | Mod: ORL | Performed by: INTERNAL MEDICINE

## 2023-04-06 PROCEDURE — 80048 BASIC METABOLIC PNL TOTAL CA: CPT | Mod: ORL | Performed by: INTERNAL MEDICINE

## 2023-04-09 ENCOUNTER — LAB REQUISITION (OUTPATIENT)
Dept: LAB | Facility: CLINIC | Age: 63
End: 2023-04-09
Payer: COMMERCIAL

## 2023-04-09 DIAGNOSIS — R60.9 EDEMA, UNSPECIFIED: ICD-10-CM

## 2023-04-11 ENCOUNTER — LAB REQUISITION (OUTPATIENT)
Dept: LAB | Facility: CLINIC | Age: 63
End: 2023-04-11
Payer: COMMERCIAL

## 2023-04-11 DIAGNOSIS — R60.9 EDEMA, UNSPECIFIED: ICD-10-CM

## 2023-04-13 LAB
ANION GAP SERPL CALCULATED.3IONS-SCNC: 13 MMOL/L (ref 7–15)
BUN SERPL-MCNC: 28.6 MG/DL (ref 8–23)
CALCIUM SERPL-MCNC: 9.3 MG/DL (ref 8.8–10.2)
CHLORIDE SERPL-SCNC: 105 MMOL/L (ref 98–107)
CREAT SERPL-MCNC: 1.02 MG/DL (ref 0.51–0.95)
DEPRECATED HCO3 PLAS-SCNC: 24 MMOL/L (ref 22–29)
GFR SERPL CREATININE-BSD FRML MDRD: 62 ML/MIN/1.73M2
GLUCOSE SERPL-MCNC: 141 MG/DL (ref 70–99)
POTASSIUM SERPL-SCNC: 4.3 MMOL/L (ref 3.4–5.3)
SODIUM SERPL-SCNC: 142 MMOL/L (ref 136–145)

## 2023-04-13 PROCEDURE — P9603 ONE-WAY ALLOW PRORATED MILES: HCPCS | Mod: ORL | Performed by: NURSE PRACTITIONER

## 2023-04-13 PROCEDURE — 36415 COLL VENOUS BLD VENIPUNCTURE: CPT | Mod: ORL | Performed by: NURSE PRACTITIONER

## 2023-04-13 PROCEDURE — 80048 BASIC METABOLIC PNL TOTAL CA: CPT | Mod: ORL | Performed by: NURSE PRACTITIONER

## 2023-04-20 PROCEDURE — 87493 C DIFF AMPLIFIED PROBE: CPT | Mod: ORL | Performed by: NURSE PRACTITIONER

## 2023-05-20 ENCOUNTER — LAB REQUISITION (OUTPATIENT)
Dept: LAB | Facility: CLINIC | Age: 63
End: 2023-05-20
Payer: COMMERCIAL

## 2023-05-20 LAB — C DIFF TOX B STL QL: NEGATIVE

## 2023-05-20 PROCEDURE — 87493 C DIFF AMPLIFIED PROBE: CPT | Mod: ORL | Performed by: INTERNAL MEDICINE

## 2023-06-19 RX ORDER — PEN NEEDLE, DIABETIC 31 G X1/4"
NEEDLE, DISPOSABLE MISCELLANEOUS
Refills: 0 | OUTPATIENT
Start: 2023-06-19

## 2023-06-19 NOTE — TELEPHONE ENCOUNTER
"TC- please call patient and assist with scheduling appointment with PCP.    Last office visit: 12/14/2020 5/29/2023:  FOLLOW UP WITH PCP WITHIN 5 DAYS POST TCU DISCHARGE      Future Appointments 6/19/2023 - 12/16/2023    None          Requested Prescriptions   Pending Prescriptions Disp Refills     TRUEPLUS PEN NEEDLES 31G X 6 MM miscellaneous [Pharmacy Med Name: TRUEplus Pen Needle 31 gauge x 1/4\"]  0     Sig: PT NEEDS NEW RX PLEASE       Diabetic Supplies Protocol Failed - 6/19/2023  1:02 PM        Failed - Medication is active on med list        Failed - Recent (6 mo) or future (30 days) visit within the authorizing provider's specialty     Patient had office visit in the last 6 months or has a visit in the next 30 days with authorizing provider.  See \"Patient Info\" tab in inbasket, or \"Choose Columns\" in Meds & Orders section of the refill encounter.            Passed - Patient is 18 years of age or older                   "